# Patient Record
Sex: FEMALE | Race: WHITE
[De-identification: names, ages, dates, MRNs, and addresses within clinical notes are randomized per-mention and may not be internally consistent; named-entity substitution may affect disease eponyms.]

---

## 2019-03-19 ENCOUNTER — HOSPITAL ENCOUNTER (OUTPATIENT)
Dept: HOSPITAL 62 - RAD | Age: 39
End: 2019-03-19
Attending: INTERNAL MEDICINE
Payer: OTHER GOVERNMENT

## 2019-03-19 DIAGNOSIS — I70.1: Primary | ICD-10-CM

## 2019-03-19 PROCEDURE — 93975 VASCULAR STUDY: CPT

## 2019-03-19 NOTE — RADIOLOGY REPORT (SQ)
EXAM DESCRIPTION:  DUPLEX ART/JANESSA FLOW COMPLETE



COMPLETED DATE/TIME:  3/19/2019 9:13 am



REASON FOR STUDY:  [id number] I70.1  ATHEROSCLEROSIS OF RENAL ARTERY



COMPARISON:  None.



TECHNIQUE:  Realtime and static grayscale images acquired. Selected color Doppler, velocities and spe
ctral images recorded.



LIMITATIONS:  None.



FINDINGS:  RIGHT KIDNEY:

RENAL ARTERY VELOCITIES: Origin and mid renal artery not visualized.  73 cm/second at the hilum.  Seg
mental artery velocity 48 cm/sec.

RENAL VEIN:  Color doppler flow present, patent.

VELOCITY RATIO: 0.79.  Normal waveforms.

KIDNEY:  Normal in size measuring 10.8 cm   No significant pathology.

LEFT KIDNEY:

RENAL ARTERY VELOCITIES: Origin and a mid renal artery not visualized.  59 cm/second at the hilum.  S
egmental artery velocity 37 cm/sec.

RENAL VEIN:  Color doppler flow present, patent.

VELOCITY RATIO: 0.63. Normal waveforms.

KIDNEY:  Normal in size measuring 10.8 cm   No significant pathology.

BLADDER: Not imaged.

OTHER: Aorto velocity of 92 cm/second.



IMPRESSION:  No Doppler evidence of hemodynamically significant renal artery stenosis.

No evidence of hydronephrosis or obstructive uropathy.



COMMENT:  NORMAL RENAL ARTERY/AORTA VELOCITY RATIO IS LESS THAN OR EQUAL TO 3.5.



TECHNICAL DOCUMENTATION:  JOB ID:  [id number]

 2011 [employer]- All Rights Reserved



Reading location - IP/workstation name: [email or web address]

## 2020-09-01 ENCOUNTER — HOSPITAL ENCOUNTER (OUTPATIENT)
Dept: HOSPITAL 62 - OD | Age: 40
End: 2020-09-01
Attending: INTERNAL MEDICINE
Payer: COMMERCIAL

## 2020-09-01 DIAGNOSIS — R80.9: ICD-10-CM

## 2020-09-01 DIAGNOSIS — N25.0: ICD-10-CM

## 2020-09-01 DIAGNOSIS — I12.9: Primary | ICD-10-CM

## 2020-09-01 DIAGNOSIS — N18.3: ICD-10-CM

## 2020-09-01 LAB
ADD MANUAL DIFF: NO
ALBUMIN SERPL-MCNC: 4.8 G/DL (ref 3.5–5)
ALP SERPL-CCNC: 161 U/L (ref 38–126)
ANION GAP SERPL CALC-SCNC: 12 MMOL/L (ref 5–19)
APPEARANCE UR: (no result)
APTT PPP: YELLOW S
AST SERPL-CCNC: 87 U/L (ref 14–36)
BASOPHILS # BLD AUTO: 0 10^3/UL (ref 0–0.2)
BASOPHILS NFR BLD AUTO: 0.5 % (ref 0–2)
BILIRUB DIRECT SERPL-MCNC: 0.3 MG/DL (ref 0–0.4)
BILIRUB SERPL-MCNC: 0.6 MG/DL (ref 0.2–1.3)
BILIRUB UR QL STRIP: NEGATIVE
BUN SERPL-MCNC: 31 MG/DL (ref 7–20)
CALCIUM: 9.7 MG/DL (ref 8.4–10.2)
CHLORIDE SERPL-SCNC: 101 MMOL/L (ref 98–107)
CO2 SERPL-SCNC: 26 MMOL/L (ref 22–30)
CREAT UR-MCNC: 61.8 MG/DL (ref 15–278)
EOSINOPHIL # BLD AUTO: 0.3 10^3/UL (ref 0–0.6)
EOSINOPHIL NFR BLD AUTO: 4.6 % (ref 0–6)
ERYTHROCYTE [DISTWIDTH] IN BLOOD BY AUTOMATED COUNT: 17.4 % (ref 11.5–14)
GLUCOSE SERPL-MCNC: 141 MG/DL (ref 75–110)
GLUCOSE UR STRIP-MCNC: NEGATIVE MG/DL
HCT VFR BLD CALC: 31.7 % (ref 36–47)
HGB BLD-MCNC: 10.6 G/DL (ref 12–15.5)
KETONES UR STRIP-MCNC: NEGATIVE MG/DL
LYMPHOCYTES # BLD AUTO: 1.8 10^3/UL (ref 0.5–4.7)
LYMPHOCYTES NFR BLD AUTO: 29.8 % (ref 13–45)
MCH RBC QN AUTO: 27.9 PG (ref 27–33.4)
MCHC RBC AUTO-ENTMCNC: 33.5 G/DL (ref 32–36)
MCV RBC AUTO: 83 FL (ref 80–97)
MONOCYTES # BLD AUTO: 0.5 10^3/UL (ref 0.1–1.4)
MONOCYTES NFR BLD AUTO: 8.4 % (ref 3–13)
NEUTROPHILS # BLD AUTO: 3.3 10^3/UL (ref 1.7–8.2)
NEUTS SEG NFR BLD AUTO: 56.7 % (ref 42–78)
NITRITE UR QL STRIP: NEGATIVE
PH UR STRIP: 5 [PH] (ref 5–9)
PHOSPHATE SERPL-MCNC: 4 MG/DL (ref 2.5–4.5)
PLATELET # BLD: 270 10^3/UL (ref 150–450)
POTASSIUM SERPL-SCNC: 4.8 MMOL/L (ref 3.6–5)
PROT SERPL-MCNC: 8.4 G/DL (ref 6.3–8.2)
PROT UR STRIP-MCNC: 14.3 MG/DL (ref ?–12)
PROT UR STRIP-MCNC: NEGATIVE MG/DL
RBC # BLD AUTO: 3.8 10^6/UL (ref 3.72–5.28)
SP GR UR STRIP: 1.01
TOTAL CELLS COUNTED % (AUTO): 100 %
UR PRO/CREAT RATIO RESULT: 0.2 MG/MG (ref 0–0.2)
UROBILINOGEN UR-MCNC: NEGATIVE MG/DL (ref ?–2)
WBC # BLD AUTO: 5.9 10^3/UL (ref 4–10.5)

## 2020-09-01 PROCEDURE — 81001 URINALYSIS AUTO W/SCOPE: CPT

## 2020-09-01 PROCEDURE — 83970 ASSAY OF PARATHORMONE: CPT

## 2020-09-01 PROCEDURE — 36415 COLL VENOUS BLD VENIPUNCTURE: CPT

## 2020-09-01 PROCEDURE — 84156 ASSAY OF PROTEIN URINE: CPT

## 2020-09-01 PROCEDURE — 82570 ASSAY OF URINE CREATININE: CPT

## 2020-09-01 PROCEDURE — 85025 COMPLETE CBC W/AUTO DIFF WBC: CPT

## 2020-09-01 PROCEDURE — 80053 COMPREHEN METABOLIC PANEL: CPT

## 2020-09-01 PROCEDURE — 84100 ASSAY OF PHOSPHORUS: CPT
